# Patient Record
Sex: FEMALE | Race: BLACK OR AFRICAN AMERICAN
[De-identification: names, ages, dates, MRNs, and addresses within clinical notes are randomized per-mention and may not be internally consistent; named-entity substitution may affect disease eponyms.]

---

## 2017-10-20 ENCOUNTER — HOSPITAL ENCOUNTER (OUTPATIENT)
Dept: HOSPITAL 45 - C.LABSPEC | Age: 32
Discharge: HOME | End: 2017-10-20
Attending: PHYSICIAN ASSISTANT
Payer: COMMERCIAL

## 2017-10-20 DIAGNOSIS — N89.8: Primary | ICD-10-CM

## 2017-10-24 LAB
CHLAMYDIA TRACH RNA***: NOT DETECTED
GC (NEIS GONORRHOEAE)RNA**: NOT DETECTED

## 2018-04-02 ENCOUNTER — HOSPITAL ENCOUNTER (OUTPATIENT)
Dept: HOSPITAL 45 - C.LAB1850 | Age: 33
Discharge: HOME | End: 2018-04-02
Attending: INTERNAL MEDICINE
Payer: COMMERCIAL

## 2018-04-02 DIAGNOSIS — R11.2: ICD-10-CM

## 2018-04-02 DIAGNOSIS — K76.0: Primary | ICD-10-CM

## 2018-04-02 DIAGNOSIS — K22.70: ICD-10-CM

## 2018-04-02 LAB
ALBUMIN SERPL-MCNC: 3.4 GM/DL (ref 3.4–5)
ALP SERPL-CCNC: 63 U/L (ref 45–117)
ALT SERPL-CCNC: 38 U/L (ref 12–78)
AST SERPL-CCNC: 21 U/L (ref 15–37)
BASOPHILS # BLD: 0.02 K/UL (ref 0–0.2)
BASOPHILS NFR BLD: 0.3 %
BUN SERPL-MCNC: 7 MG/DL (ref 7–18)
CALCIUM SERPL-MCNC: 8.9 MG/DL (ref 8.5–10.1)
CO2 SERPL-SCNC: 25 MMOL/L (ref 21–32)
CREAT SERPL-MCNC: 1.04 MG/DL (ref 0.6–1.2)
EOS ABS #: 0.01 K/UL (ref 0–0.5)
EOSINOPHIL NFR BLD AUTO: 270 K/UL (ref 130–400)
GLUCOSE SERPL-MCNC: 82 MG/DL (ref 70–99)
HCT VFR BLD CALC: 36.1 % (ref 37–47)
HEP C IGG  13 YRS+OLDER_RFLX: (no result)
HGB BLD-MCNC: 12.4 G/DL (ref 12–16)
IG#: 0.01 K/UL (ref 0–0.02)
IMM GRANULOCYTES NFR BLD AUTO: 36.7 %
LIPASE: 78 U/L (ref 73–393)
LYMPHOCYTES # BLD: 2.63 K/UL (ref 1.2–3.4)
MCH RBC QN AUTO: 26.5 PG (ref 25–34)
MCHC RBC AUTO-ENTMCNC: 34.3 G/DL (ref 32–36)
MCV RBC AUTO: 77.1 FL (ref 80–100)
MONO ABS #: 0.29 K/UL (ref 0.11–0.59)
MONOCYTES NFR BLD: 4 %
NEUT ABS #: 4.21 K/UL (ref 1.4–6.5)
NEUTROPHILS # BLD AUTO: 0.1 %
NEUTROPHILS NFR BLD AUTO: 58.8 %
PMV BLD AUTO: 10.3 FL (ref 7.4–10.4)
POTASSIUM SERPL-SCNC: 3.6 MMOL/L (ref 3.5–5.1)
PROT SERPL-MCNC: 8 GM/DL (ref 6.4–8.2)
RED CELL DISTRIBUTION WIDTH CV: 14.7 % (ref 11.5–14.5)
RED CELL DISTRIBUTION WIDTH SD: 41.7 FL (ref 36.4–46.3)
SODIUM SERPL-SCNC: 137 MMOL/L (ref 136–145)
WBC # BLD AUTO: 7.17 K/UL (ref 4.8–10.8)

## 2018-04-03 LAB — HBA1C MFR BLD: 5.6 % (ref 4.5–5.6)

## 2018-04-04 LAB
HEPATITIS A IGM TC 51813E: (no result)
HEPATITIS B CORE IGM  TC51854R: (no result)

## 2018-05-16 ENCOUNTER — HOSPITAL ENCOUNTER (OUTPATIENT)
Dept: HOSPITAL 45 - C.GI | Age: 33
Discharge: HOME | End: 2018-05-16
Attending: INTERNAL MEDICINE
Payer: COMMERCIAL

## 2018-05-16 VITALS — DIASTOLIC BLOOD PRESSURE: 62 MMHG | SYSTOLIC BLOOD PRESSURE: 106 MMHG | OXYGEN SATURATION: 99 % | HEART RATE: 74 BPM

## 2018-05-16 VITALS
BODY MASS INDEX: 39.78 KG/M2 | WEIGHT: 265.55 LBS | HEIGHT: 68.5 IN | BODY MASS INDEX: 39.78 KG/M2 | HEIGHT: 68.5 IN | WEIGHT: 265.55 LBS

## 2018-05-16 DIAGNOSIS — K76.0: ICD-10-CM

## 2018-05-16 DIAGNOSIS — D57.3: ICD-10-CM

## 2018-05-16 DIAGNOSIS — Z79.899: ICD-10-CM

## 2018-05-16 DIAGNOSIS — F32.9: ICD-10-CM

## 2018-05-16 DIAGNOSIS — E66.01: ICD-10-CM

## 2018-05-16 DIAGNOSIS — R11.0: ICD-10-CM

## 2018-05-16 DIAGNOSIS — K44.9: ICD-10-CM

## 2018-05-16 DIAGNOSIS — K21.0: ICD-10-CM

## 2018-05-16 DIAGNOSIS — K22.70: Primary | ICD-10-CM

## 2018-05-16 NOTE — ENDO HISTORY AND PHYSICAL
History & Physical


Date of Service:


May 16, 2018.


Chief Complaint:


Barretts esophagus, nausea


Referring Physician:


Dr Escalante


History of Present Illness


33 yo female who presents for EGD secondary to Rebolledo's esophagus and nausea.





Past Surgical History


Hx Cardiac Surgery:  No


Hx Internal Defibrillator:  No


Hx Pacemaker:  No


Hx Abdominal Surgery:  No


Hx of Implantable Prosthesis:  No


Hx Post-Op Nausea and Vomiting:  No


Hx Cancer Surgery:  No


Hx Thoracic Surgery:  No


Hx Orthopedic:  No


Hx Urinary Tract Surgery:  No





Social History


Smoking Status:  Never Smoker


Hx Substance Use:  No


Hx Alcohol Use:  Yes (OCCASSIONALLY)





Allergies


Coded Allergies:  


     No Known Allergies (Verified , 5/16/18)





Current Medications





Reported Home Medications








 Medications  Dose


 Route/Sig


 Max Daily Dose Days Date Category


 


 Wellbutrin Sr


  (Bupropion HCl)


 200 Mg Ertab  200 Mg


 PO BID


    4/24/18 Reported


 


 Vyvanse


  (Lisdexamfetamine


 Dimesylate) 60 Mg


 Cap  1 Cap


 PO DAILY


   30 4/24/18 Reported


 


 Protonix


  (Pantoprazole


 Sodium) 40 Mg Tab  40 Mg


 PO DAILY


    4/24/18 Reported


 


 Zofran


  (Ondansetron HCl)


 4 Mg Tab  4 Mg


 PO BID PRN


    4/24/18 Reported


 


 Aygestin


  (Norethindrone


 Acetate) 5 Mg Tab  5 Mg


 PO DAILY


    4/24/18 Reported


 


 Lunesta


  (Eszopiclone) 2


 Mg Tab  2 Mg


 PO HS


    4/24/18 Reported


 


 Advil (Ibuprofen)


 200 Mg Tab  200 Mg


 PO Q8 PRN


    4/24/18 Reported


 


 Flonase Allergy


 Relief


  (Fluticasone


 Propionate


  (Nasal)) 50


 Mcg/Act Spr  1 Spray


 EVY QAM PRN


    7/27/16 Reported


 


 Claritin


  (Loratadine) 10


 Mg Cap  10 Mg


 PO QAM PRN


    7/27/16 Reported











Vital Signs


Weight (Kilograms):  120.45


Height (Feet):  5


Height (Inches):  8.5





Physical Exam


General Appearance:  WD/WN, no apparent distress


Respiratory/Chest:  


   Auscultation:  breath sounds normal


Cardiovascular:  


   Heart Auscultation:  RRR


Abdomen:  


   Bowel Sounds:  normal


   Inspection & Palpation:  soft, non-distended, no tenderness, guarding & 

rebound





Assessment and Plan


Assessment:


33 yo female who presents for EGD secondary to Rebolledo's esophagus and nausea.








Plan:


Proceed with EGD.

## 2018-05-16 NOTE — GI REPORT
Patient Name: Tatum Vázquez

Procedure Date: 5/16/2018 2:45 PM

MRN: [id number]

Account Number: [id number]

YOB: 1985

Admit Type: Outpatient

Age: 32

Gender: Female

Attending MD: Andres Licea DO

Procedure:            Upper GI endoscopy

Providers:            Andres Licea DO

Referring MD:         Aleja Jaramillo

Indications:          Follow-up of Rebolledo's esophagus

Medicines:            Monitored Anesthesia Care

Complications:        No immediate complications.

Estimated Blood Loss: Estimated blood loss: none.

Procedure:            Pre-Anesthesia Assessment:

                      - Prior to the procedure, a History and Physical was 

                      performed, and patient medications and allergies were 

                      reviewed. The patient's tolerance of previous 

                      anesthesia was also reviewed. The risks and benefits of 

                      the procedure and the sedation options and risks were 

                      discussed with the patient. All questions were 

                      answered, and informed consent was obtained. Prior 

                      Anticoagulants: The patient has taken no previous 

                      anticoagulant or antiplatelet agents. ASA Grade 

                      Assessment: II - A patient with mild systemic disease. 

                      After reviewing the risks and benefits, the patient was 

                      deemed in satisfactory condition to undergo the 

                      procedure.

                      After obtaining informed consent, the endoscope was 

                      passed under direct vision. Throughout the procedure, 

                      the patient's blood pressure, pulse, and oxygen 

                      saturations were monitored continuously. The On-site 

                      loaner was introduced through the mouth, and advanced 

                      to the second part of duodenum. The upper GI endoscopy 

                      was accomplished without difficulty. The patient 

                      tolerated the procedure well.

Findings:

     There were esophageal mucosal changes consistent with short-segment 

     Rebolledo's esophagus present at the gastroesophageal junction. The 

     maximum longitudinal extent of these mucosal changes was 3 cm in length. 

     Mucosa was biopsied with a cold forceps for histology. One specimen 

     bottle was sent to pathology.

     A small hiatal hernia was present.

     The examined duodenum was normal.

Impression:           - Esophageal mucosal changes consistent with 

                      short-segment Rebolledo's esophagus. Biopsied.

                      - Small hiatal hernia.

                      - Normal examined duodenum.

Recommendation:       - Resume previous diet.

                      - Continue present medications.

                      - Await pathology results.

                      - Return to primary care physician as previously 

                      scheduled.

Andres Licea DO

5/16/2018 3:34:18 PM

This report has been signed electronically.

Note Initiated On: 5/16/2018 2:45 PM

Number of Addenda: 0

     I attest to the content of the Intraoperative Record and orders 

     documented therein, exceptions below



{[id number]}

## 2018-05-16 NOTE — ANESTHESIOLOGY PROGRESS NOTE
Anesthesia Post Op Note


Date & Time


May 16, 2018 at 16:26





Vital Signs


Pain Intensity:  0





Vital Signs Past 12 Hours








  Date Time  Temp Pulse Resp B/P (MAP) Pulse Ox O2 Delivery O2 Flow Rate FiO2


 


5/16/18 16:00  74 18 106/62 (77) 99 Room Air  


 


5/16/18 15:45  73 18 103/66 (78) 100 Room Air  


 


5/16/18 15:30  88 16 103/55 (71) 100 Room Air  


 


5/16/18 15:04 36.8 85 20 136/85 (102) 99 Room Air  











Notes


Mental Status:  alert / awake / arousable, participated in evaluation


Pt Amnestic to Procedure:  Yes


Nausea / Vomiting:  adequately controlled


Pain:  adequately controlled


Airway Patency, RR, SpO2:  stable & adequate


BP & HR:  stable & adequate


Hydration State:  stable & adequate


Anesthetic Complications:  no major complications apparent

## 2023-04-24 NOTE — DISCHARGE INSTRUCTIONS
Endoscopy Patient Instructions


Date / Procedure(s) Performed


May 16, 2018.


EGD





Allergy Information


Coded Allergies:  


     No Known Allergies (Verified , 5/16/18)





Discharge Date / Findings


May 16, 2018.


Rebolledo's esophagus s/p biopsies


Hiatal hernia





Medication Instructions


OK to resume all medications today as prescribed





Reported Home Medications








 Medications  Dose


 Route/Sig


 Max Daily Dose Days Date Category


 


 Wellbutrin Sr


  (Bupropion HCl)


 200 Mg Ertab  200 Mg


 PO BID


    4/24/18 Reported


 


 Vyvanse


  (Lisdexamfetamine


 Dimesylate) 60 Mg


 Cap  1 Cap


 PO DAILY


   30 4/24/18 Reported


 


 Protonix


  (Pantoprazole


 Sodium) 40 Mg Tab  40 Mg


 PO DAILY


    4/24/18 Reported


 


 Zofran


  (Ondansetron HCl)


 4 Mg Tab  4 Mg


 PO BID PRN


    4/24/18 Reported


 


 Aygestin


  (Norethindrone


 Acetate) 5 Mg Tab  5 Mg


 PO DAILY


    4/24/18 Reported


 


 Lunesta


  (Eszopiclone) 2


 Mg Tab  2 Mg


 PO HS


    4/24/18 Reported


 


 Advil (Ibuprofen)


 200 Mg Tab  200 Mg


 PO Q8 PRN


    4/24/18 Reported


 


 Flonase Allergy


 Relief


  (Fluticasone


 Propionate


  (Nasal)) 50


 Mcg/Act Spr  1 Spray


 EVY QAM PRN


    7/27/16 Reported


 


 Claritin


  (Loratadine) 10


 Mg Cap  10 Mg


 PO QAM PRN


    7/27/16 Reported











Provider Instructions





Activity Restrictions





-  No exercising or heavy lifting for 24 hours. 


-  Do not drink alcohol the day of the procedure.


-  Do not drive a car or operate machinery until the day after the procedure.


-  Do not make any important decisions or sign important papers in 24 hours 

after the procedure.





Following Day:





-  Return to full activity which may include returning to work/school.





Diet





Start your diet with liquids and light foods (jello, soup, juice, toast).  Then 

eat your usual diet if not nauseated.





Treatment For Common After Affects





For mild abdominal pain, bloating, or excessive gas:





-  Rest


-  Eat lightly


-  Lie on right side





Follow-Up Information


Follow-up with Dr Escalante as scheduled





Anesthesia Information





What You Should Know





You have had a procedure that required some medicine to reduce anxiety and 

discomfort. This treatment is called moderate sedation.  


After receiving the treatment, you may be sleepy, but you will be able to 

breathe on your own.  The effects of the treatment may last for several hours.








Follow these instructions along with Activity/Diet recommendations noted above:





*  Do NOT do anything where dizziness or clumsiness would be dangerous.





*  Rest quietly at home today, then you can be up and about tomorrow.





*  Have a responsible person stay with you the rest of today.





*  You may have had an I.V. today.  If so, you may take the dressing off later 

today.





Recommendations


 


Call your doctor if:





*  Trouble breathing 





*  Continuous vomiting for more than 24 hours








*  Temperature above 101 degrees





*  Severe abdominal pain or bloating





*  Pain not relieved by pain medicine ordered





*  There is increased drainage or redness from any incision





*  A large amount of rectal bleeding greater than 2-3 tablespoons. 


   (If you had a polyp/s removed or have hemorrhoids, a small amount of blood -


    from the rectum is to be expected.)





*  You have any unanswered questions or concerns.








IN THE EVENT OF A SERIOUS EMERGENCY, GO TO THE NEAREST EMERGENCY ROOM








       Your discharge instructions were prepared by provider Andres Licea.





 Patient Instructions Signature Page














Tatum Vázquez 











Patient (or Guardian) Signature/Date:____________________________________ I 

have read and understand the instructions given to me by my caregivers.








Caregiver/RN/Doctor Signature/Date:____________________________________











The above-named patient and/or guardian has received patient instructions on 

this date.





























+  Original Patient Signature Page (only) stays with chart.  Please make copy 

for patient.
Stable.